# Patient Record
Sex: FEMALE | Race: WHITE | NOT HISPANIC OR LATINO | Employment: PART TIME | URBAN - METROPOLITAN AREA
[De-identification: names, ages, dates, MRNs, and addresses within clinical notes are randomized per-mention and may not be internally consistent; named-entity substitution may affect disease eponyms.]

---

## 2019-06-06 ENCOUNTER — OFFICE VISIT (OUTPATIENT)
Dept: NEUROLOGY | Facility: CLINIC | Age: 19
End: 2019-06-06

## 2019-06-06 VITALS
HEART RATE: 64 BPM | BODY MASS INDEX: 22.16 KG/M2 | WEIGHT: 133 LBS | OXYGEN SATURATION: 99 % | HEIGHT: 65 IN | RESPIRATION RATE: 16 BRPM | DIASTOLIC BLOOD PRESSURE: 62 MMHG | SYSTOLIC BLOOD PRESSURE: 100 MMHG

## 2019-06-06 DIAGNOSIS — F44.9 CONVERSION DISORDER: ICD-10-CM

## 2019-06-06 DIAGNOSIS — G40.909 SEIZURE DISORDER (HCC): Primary | ICD-10-CM

## 2019-06-06 PROCEDURE — 99205 OFFICE O/P NEW HI 60 MIN: CPT | Performed by: PSYCHIATRY & NEUROLOGY

## 2019-06-06 RX ORDER — LEVETIRACETAM 250 MG/1
TABLET ORAL
COMMUNITY
Start: 2019-05-21 | End: 2019-08-15

## 2019-06-06 NOTE — PROGRESS NOTES
Subjective:    CC: Denice Beth is seen today in consultation at the request of Perfecto Marcelino* for Seizures       HPI:  18-year-old female accompanied by her father with a history of anxiety and depression presents with seizure-like episodes.  As per patient she had a first episode about 5 months ago.  This occurred while she was taking a nap and stiffened up followed by whole body shaking that lasted for about 5 minutes.  Denies any bowel or bladder incontinence at the time.  She was taken to the ER in [place] where she had a CT head  and an EEG that was reportedly normal.  After that she was started on Keppra 250 mg twice daily.  Prior to that she had been on Zoloft however her PCP took her off of it as he felt that could be the cause of the seizures.  Since then patient has had 4-5 more episodes.  Most of her episodes are during sleep where her eyes rolled back and she starts to shake lasting for about 10 to 15 minutes.  She can typically hear people around her but cannot respond.  She has also had bladder incontinence but denies any tongue bites.  She is confused for about 30 minutes after the episode and is lethargic for up to 2 days.  She denies any history of febrile seizures as a child, any head injuries, any family history of seizures or any abuse.  Patient has been under a lot of stress recently due to the divorce of her parents, her job and not being able to drive.    The following portions of the patient's history were reviewed today and updated as of 06/06/2019  : allergies, current medications, past family history, past medical history, past social history, past surgical history and problem list  These document will be scanned to patient's chart.      Current Outpatient Medications:   •  levETIRAcetam (KEPPRA) 250 MG tablet, , Disp: , Rfl:    Past Medical History:   Diagnosis Date   • Anxiety    • Depression    • Migraine    • Seizures (CMS/HCC)       History reviewed. No pertinent surgical  history.   Family History   Problem Relation Age of Onset   • Hypertension Father    • Alcohol abuse Maternal Grandmother    • Heart disease Maternal Grandmother    • Hypertension Maternal Grandmother    • Skin cancer Maternal Grandfather    • Diabetes Maternal Grandfather    • Heart disease Maternal Grandfather    • Hypertension Maternal Grandfather       Social History     Socioeconomic History   • Marital status: Single     Spouse name: Not on file   • Number of children: Not on file   • Years of education: Not on file   • Highest education level: Not on file   Tobacco Use   • Smoking status: Never Smoker   • Smokeless tobacco: Never Used   Substance and Sexual Activity   • Alcohol use: No     Frequency: Never   • Drug use: No   • Sexual activity: Defer     Review of Systems   Constitutional: Positive for activity change, appetite change, chills and fatigue.   HENT: Negative.    Eyes: Positive for photophobia, pain and visual disturbance.   Respiratory: Positive for chest tightness, shortness of breath and wheezing.    Cardiovascular: Positive for chest pain and palpitations.   Gastrointestinal: Positive for nausea and vomiting.   Endocrine: Positive for polydipsia.   Genitourinary: Negative.    Musculoskeletal: Positive for back pain, neck pain and neck stiffness.   Skin: Negative.    Allergic/Immunologic: Negative.    Neurological: Positive for dizziness, seizures, speech difficulty, weakness, light-headedness, numbness, headache and confusion.   Hematological: Negative.    Psychiatric/Behavioral: Positive for decreased concentration, sleep disturbance and depressed mood. The patient is nervous/anxious.        Objective:    As noted in the chart    Neurology Exam:    General apperance: NAD.     Mental status: Alert, awake and oriented to time place and person.    Recent and Remote memory: Intact.    Attention span and Concentration: Normal.     Language and Speech: Intact- No dysarthria.    Fluency, Naming ,  Repitition and Comprehension:  Intact    Cranial Nerves:   CN II: Visual fields are full. Intact. Fundi - Normal, No papillederma, Pupils - RYANNE  CN III, IV and VI: Extraocular movements are intact. Normal saccades.   CN V: Facial sensation is intact.   CN VII: Muscles of facial expression reveal no asymmetry. Intact.   CN VIII: Hearing is intact. Whispered voice intact.   CN IX and X: Palate elevates symmetrically. Intact  CN XI: Shoulder shrug is intact.   CN XII: Tongue is midline without evidence of atrophy or fasciculation.     Ophthalmoscopic exam of optic disc-normal    Motor:  Right UE muscle strength 5/5. Normal tone.     Left UE muscle strength 5/5. Normal tone.      Right LE muscle strength5/5. Normal tone.     Left LE muscle strength 5/5. Normal tone.      Sensory: Normal light touch, vibration and pinprick sensation bilaterally.    DTRs: 2+ bilaterally in upper and lower extremities.    Babinski: Negative bilaterally.    Co-ordination: Normal finger-to-nose, heel to shin B/L.    Rhomberg: Negative.    Gait: Normal.    Cardiovascular: Regular rate and rhythm without murmur, gallop or rub.    Assessment and Plan:  1. Seizure disorder (CMS/HCC)  Patient may have a combination epileptic and nonepileptic episodes  I will get an MRI of the brain and a sleep deprived EEG  For now I have asked her to continue Keppra 250 mg twice daily  Counseled on seizure precautions.  Patient is currently not driving    - MRI Brain Without Contrast; Future  - EEG; Future    2. Conversion disorder  Based on the semiology of some of her episodes I definitely feel that she could be having stress-induced nonepileptic episodes  I explained this to her in detail and she seems accepting of the diagnosis  I have asked her to speak to her PCP about restarting Zoloft at lower doses.  Will also give her a referral for psychotherapy    - Ambulatory Referral to Psychotherapy     Addendum-EEG done on 6/27/19 at [facility] was normal  Return in about 2 months (around 8/6/2019).     I spent over 60 minutes with the patient face to face out of which over 50% (30 minutes) was spent in management, instructions and education regarding her epileptic and nonepileptic episodes.     Uzma Law MD

## 2019-06-25 ENCOUNTER — DOCUMENTATION (OUTPATIENT)
Dept: NEUROLOGY | Facility: CLINIC | Age: 19
End: 2019-06-25

## 2019-08-15 ENCOUNTER — OFFICE VISIT (OUTPATIENT)
Dept: NEUROLOGY | Facility: CLINIC | Age: 19
End: 2019-08-15

## 2019-08-15 VITALS
DIASTOLIC BLOOD PRESSURE: 68 MMHG | WEIGHT: 134.8 LBS | SYSTOLIC BLOOD PRESSURE: 110 MMHG | HEIGHT: 65 IN | BODY MASS INDEX: 22.46 KG/M2 | HEART RATE: 72 BPM | OXYGEN SATURATION: 98 %

## 2019-08-15 DIAGNOSIS — F44.9 CONVERSION DISORDER: Primary | ICD-10-CM

## 2019-08-15 PROCEDURE — 99213 OFFICE O/P EST LOW 20 MIN: CPT | Performed by: PSYCHIATRY & NEUROLOGY

## 2019-08-15 RX ORDER — ESCITALOPRAM OXALATE 10 MG/1
TABLET ORAL
COMMUNITY
Start: 2019-08-06

## 2019-08-15 NOTE — PROGRESS NOTES
Subjective:    CC: Denice Beth is seen today for Seizures       HPI:  Current visit- since her last visit her episode frequency has reduced and she had just one event last week where she felt anxious and had breathing difficulties with dizziness followed by shaking for a few minutes.  She had a headache afterwards and was somnolent.  Patient went to [facility] recently but was only started on Lexapro 10 mg daily but did not undergo any psychotherapy yet.  She has now stopped taking Keppra.  Had an EEG in May as well that was normal.  Also had a MRI brain that the results of which were not sent to me.    Initial [id number]-old female accompanied by her father with a history of anxiety and depression presents with seizure-like episodes.  As per patient she had a first episode about 5 months ago.  This occurred while she was taking a nap and stiffened up followed by whole body shaking that lasted for about 5 minutes.  Denies any bowel or bladder incontinence at the time.  She was taken to the ER in [place] where she had a CT head  and an EEG that was reportedly normal.  After that she was started on Keppra 250 mg twice daily.  Prior to that she had been on Zoloft however her PCP took her off of it as he felt that could be the cause of the seizures.  Since then patient has had 4-5 more episodes.  Most of her episodes are during sleep where her eyes rolled back and she starts to shake lasting for about 10 to 15 minutes.  She can typically hear people around her but cannot respond.  She has also had bladder incontinence but denies any tongue bites.  She is confused for about 30 minutes after the episode and is lethargic for up to 2 days.  She denies any history of febrile seizures as a child, any head injuries, any family history of seizures or any abuse.  Patient has been under a lot of stress recently due to the divorce of her parents, her job and not being able to drive.    The following portions  "of the patient's history were reviewed today and updated as of 08/15/2019  : allergies, current medications, past family history, past medical history, past social history, past surgical history and problem list  These document will be scanned to patient's chart.      Current Outpatient Medications:   •  escitalopram (LEXAPRO) 10 MG tablet, , Disp: , Rfl:    Past Medical History:   Diagnosis Date   • Anxiety    • Depression    • Migraine    • Seizures (CMS/HCC)       No past surgical history on file.   Family History   Problem Relation Age of Onset   • Hypertension Father    • Alcohol abuse Maternal Grandmother    • Heart disease Maternal Grandmother    • Hypertension Maternal Grandmother    • Skin cancer Maternal Grandfather    • Diabetes Maternal Grandfather    • Heart disease Maternal Grandfather    • Hypertension Maternal Grandfather       Social History     Socioeconomic History   • Marital status: Single     Spouse name: Not on file   • Number of children: Not on file   • Years of education: Not on file   • Highest education level: Not on file   Tobacco Use   • Smoking status: Never Smoker   • Smokeless tobacco: Never Used   Substance and Sexual Activity   • Alcohol use: No     Frequency: Never   • Drug use: No   • Sexual activity: Defer     Review of Systems   Neurological: Positive for seizures, headache, memory problem and confusion.   All other systems reviewed and are negative.      Objective:    /68   Pulse 72   Ht 165.1 cm (65\")   Wt 61.1 kg (134 lb 12.8 oz)   SpO2 98%   BMI 22.43 kg/m²     Neurology Exam:    General apperance: NAD.     Mental status: Alert, awake and oriented to time place and person.    Recent and Remote memory: Intact.    Attention span and Concentration: Normal.     Language and Speech: Intact- No dysarthria.    Fluency, Naming , Repitition and Comprehension:  Intact    Cranial Nerves:   CN II: Visual fields are full. Intact. Fundi - Normal, No papillederma, Pupils - " RYANNE  CN III, IV and VI: Extraocular movements are intact. Normal saccades.   CN V: Facial sensation is intact.   CN VII: Muscles of facial expression reveal no asymmetry. Intact.   CN VIII: Hearing is intact. Whispered voice intact.   CN IX and X: Palate elevates symmetrically. Intact  CN XI: Shoulder shrug is intact.   CN XII: Tongue is midline without evidence of atrophy or fasciculation.     Ophthalmoscopic exam of optic disc-normal    Motor:  Right UE muscle strength 5/5. Normal tone.     Left UE muscle strength 5/5. Normal tone.      Right LE muscle strength5/5. Normal tone.     Left LE muscle strength 5/5. Normal tone.      Sensory: Normal light touch, vibration and pinprick sensation bilaterally.    DTRs: 2+ bilaterally in upper and lower extremities.    Babinski: Negative bilaterally.    Co-ordination: Normal finger-to-nose, heel to shin B/L.    Rhomberg: Negative.    Gait: Normal.    Cardiovascular: Regular rate and rhythm without murmur, gallop or rub.    Assessment and Plan:  1. Conversion disorder  At her last visit and again at this visit I spoke to the patient and her father in detail about psychogenic nonepileptic spells and on ways to cope with them.  She has started Lexapro 10 mg daily but I also feel that she should undergo counseling for these episodes.  Will obtain the results of her MRI brain.    Return in about 6 months (around 2/15/2020).     Uzma Law MD

## 2019-12-16 ENCOUNTER — OFFICE VISIT (OUTPATIENT)
Dept: NEUROLOGY | Facility: CLINIC | Age: 19
End: 2019-12-16

## 2019-12-16 VITALS
DIASTOLIC BLOOD PRESSURE: 72 MMHG | HEART RATE: 82 BPM | WEIGHT: 130 LBS | SYSTOLIC BLOOD PRESSURE: 116 MMHG | HEIGHT: 65 IN | BODY MASS INDEX: 21.66 KG/M2 | OXYGEN SATURATION: 98 %

## 2019-12-16 DIAGNOSIS — F44.9 CONVERSION DISORDER: Primary | ICD-10-CM

## 2019-12-16 PROBLEM — F41.9 ANXIETY: Status: ACTIVE | Noted: 2019-12-16

## 2019-12-16 PROCEDURE — 99213 OFFICE O/P EST LOW 20 MIN: CPT | Performed by: PSYCHIATRY & NEUROLOGY

## 2019-12-16 RX ORDER — BUSPIRONE HYDROCHLORIDE 7.5 MG/1
7.5 TABLET ORAL 2 TIMES DAILY
Qty: 60 TABLET | Refills: 3 | Status: SHIPPED | OUTPATIENT
Start: 2019-12-16

## 2019-12-16 NOTE — PROGRESS NOTES
Subjective:    CC: Denice Beth is seen today   for Seizure like episodes        HPI:  Current visit- since her last visit her episodes have worsened and she is currently having about 1 episode each week.  She has a dull headache prior to the episode and then stiffens up and starts to shake.  The shaking typically lasts for over 5 minutes.  Patient has no recollection of the episode.  Patient states that she does try to pull herself out of an episode when it begins but is unable to do so recently.  She went to [facility] however they did not start psychotherapy, instead put her on Lexapro which does not seem to be helping with her anxiety.  Has recently started seeing a therapist in [place] but has not made any progress in terms of the spells yet.    Last visit-since her last visit her episode frequency has reduced and she had just one event last week where she felt anxious and had breathing difficulties with dizziness followed by shaking for a few minutes.  She had a headache afterwards and was somnolent.  Patient went to [facility] recently but was only started on Lexapro 10 mg daily but did not undergo any psychotherapy yet.  She has now stopped taking Keppra.  Had an EEG in May as well that was normal.  Also had a MRI brain that the results of which were not sent to me.    Initial [id number]-old female accompanied by her father with a history of anxiety and depression presents with seizure-like episodes.  As per patient she had a first episode about 5 months ago.  This occurred while she was taking a nap and stiffened up followed by whole body shaking that lasted for about 5 minutes.  Denies any bowel or bladder incontinence at the time.  She was taken to the ER in [place] where she had a CT head  and an EEG that was reportedly normal.  After that she was started on Keppra 250 mg twice daily.  Prior to that she had been on Zoloft however her PCP took her off of it as he  felt that could be the cause of the seizures.  Since then patient has had 4-5 more episodes.  Most of her episodes are during sleep where her eyes rolled back and she starts to shake lasting for about 10 to 15 minutes.  She can typically hear people around her but cannot respond.  She has also had bladder incontinence but denies any tongue bites.  She is confused for about 30 minutes after the episode and is lethargic for up to 2 days.  She denies any history of febrile seizures as a child, any head injuries, any family history of seizures or any abuse.  Patient has been under a lot of stress recently due to the divorce of her parents, her job and not being able to drive    The following portions of the patient's history were reviewed today and updated as of 12/16/2019  : allergies, current medications, past family history, past medical history, past social history, past surgical history and problem list  These document will be scanned to patient's chart.      Current Outpatient Medications:   •  escitalopram (LEXAPRO) 10 MG tablet, , Disp: , Rfl:   •  busPIRone (BUSPAR) 7.5 MG tablet, Take 1 tablet by mouth 2 (Two) Times a Day., Disp: 60 tablet, Rfl: 3   Past Medical History:   Diagnosis Date   • Anxiety    • Depression    • Migraine    • Seizures (CMS/HCC)       No past surgical history on file.   Family History   Problem Relation Age of Onset   • Hypertension Father    • Alcohol abuse Maternal Grandmother    • Heart disease Maternal Grandmother    • Hypertension Maternal Grandmother    • Skin cancer Maternal Grandfather    • Diabetes Maternal Grandfather    • Heart disease Maternal Grandfather    • Hypertension Maternal Grandfather       Social History     Socioeconomic History   • Marital status: Single     Spouse name: Not on file   • Number of children: Not on file   • Years of education: Not on file   • Highest education level: Not on file   Tobacco Use   • Smoking status: Never Smoker   • Smokeless tobacco:  "Never Used   Substance and Sexual Activity   • Alcohol use: No     Frequency: Never   • Drug use: No   • Sexual activity: Defer     Review of Systems   Neurological: Positive for seizures and headache.   All other systems reviewed and are negative.      Objective:    /72   Pulse 82   Ht 165.1 cm (65\")   Wt 59 kg (130 lb)   SpO2 98%   BMI 21.63 kg/m²     Neurology Exam:    General apperance: NAD.     Mental status: Alert, awake and oriented to time place and person.    Recent and Remote memory: Intact.    Attention span and Concentration: Normal.     Language and Speech: Intact- No dysarthria.    Fluency, Naming , Repitition and Comprehension:  Intact    Cranial Nerves:   CN II: Visual fields are full. Intact. Fundi - Normal, No papillederma, Pupils - RYANNE  CN III, IV and VI: Extraocular movements are intact. Normal saccades.   CN V: Facial sensation is intact.   CN VII: Muscles of facial expression reveal no asymmetry. Intact.   CN VIII: Hearing is intact. Whispered voice intact.   CN IX and X: Palate elevates symmetrically. Intact  CN XI: Shoulder shrug is intact.   CN XII: Tongue is midline without evidence of atrophy or fasciculation.     Ophthalmoscopic exam of optic disc-normal    Motor:  Right UE muscle strength 5/5. Normal tone.     Left UE muscle strength 5/5. Normal tone.      Right LE muscle strength5/5. Normal tone.     Left LE muscle strength 5/5. Normal tone.      Sensory: Normal light touch, vibration and pinprick sensation bilaterally.    DTRs: 2+ bilaterally in upper and lower extremities.    Babinski: Negative bilaterally.    Co-ordination: Normal finger-to-nose, heel to shin B/L.    Rhomberg: Negative.    Gait: Normal.    Cardiovascular: Regular rate and rhythm without murmur, gallop or rub.    Assessment and Plan:  1. Conversion disorder  Patient most likely has psychogenic nonepileptic episodes based on the semiology of these episodes.  She had an EEG that was normal.  Her MRI brain " results were never sent to me  I will start her on low doses of BuSpar 7.5 mg twice a day for anxiety. Is also on lexapro  I will again make a referral for psychotherapy which I feel is the mainstay of treatment for these episodes    - Ambulatory Referral to Psychotherapy       Return in about 3 months (around 3/16/2020).         Uzma Law MD

## 2020-03-16 ENCOUNTER — OFFICE VISIT (OUTPATIENT)
Dept: NEUROLOGY | Facility: CLINIC | Age: 20
End: 2020-03-16

## 2020-03-16 VITALS — WEIGHT: 130 LBS | HEIGHT: 65 IN | OXYGEN SATURATION: 99 % | BODY MASS INDEX: 21.66 KG/M2 | HEART RATE: 100 BPM

## 2020-03-16 DIAGNOSIS — F44.9 CONVERSION DISORDER: Primary | ICD-10-CM

## 2020-03-16 PROCEDURE — 99213 OFFICE O/P EST LOW 20 MIN: CPT | Performed by: PSYCHIATRY & NEUROLOGY

## 2020-03-16 NOTE — PROGRESS NOTES
Subjective:    CC: Denice Beth is seen today  for Seizures       HPI:  Current visit- patient states that she has had 3 seizure-like episodes since she last saw me in August.  Her last such episode was on February 23 when she started shaking for about 3 minutes.  This was witnessed by her father.  Once again patient could hear her father speak to her but could not respond back.  She did not bite her tongue or lose bowel/bladder incontinence.  Sometimes her father counting numbers to calm her down can help abort an event.  Patient states that she takes an extra dose of Lexapro 10 mg daily if she is very anxious.  Has stopped going to [facility] as she was not happy with her care.    Last visit-since her last visit her episodes have worsened and she is currently having about 1 episode each week.  She has a dull headache prior to the episode and then stiffens up and starts to shake.  The shaking typically lasts for over 5 minutes.  Patient has no recollection of the episode.  Patient states that she does try to pull herself out of an episode when it begins but is unable to do so recently.  She went to [facility] however they did not start psychotherapy, instead put her on Lexapro which does not seem to be helping with her anxiety.  Has recently started seeing a therapist in [place] but has not made any progress in terms of the spells yet.    Last visit-since her last visit her episode frequency has reduced and she had just one event last week where she felt anxious and had breathing difficulties with dizziness followed by shaking for a few minutes.  She had a headache afterwards and was somnolent.  Patient went to [facility] recently but was only started on Lexapro 10 mg daily but did not undergo any psychotherapy yet.  She has now stopped taking Keppra.  Had an EEG in May as well that was normal.  Also had a MRI brain that the results of which were not sent to  me.    Initial [id number]old female accompanied by her father with a history of anxiety and depression presents with seizure-like episodes.  As per patient she had a first episode about 5 months ago.  This occurred while she was taking a nap and stiffened up followed by whole body shaking that lasted for about 5 minutes.  Denies any bowel or bladder incontinence at the time.  She was taken to the ER in [place] where she had a CT head  and an EEG that was reportedly normal.  After that she was started on Keppra 250 mg twice daily.  Prior to that she had been on Zoloft however her PCP took her off of it as he felt that could be the cause of the seizures.  Since then patient has had 4-5 more episodes.  Most of her episodes are during sleep where her eyes rolled back and she starts to shake lasting for about 10 to 15 minutes.  She can typically hear people around her but cannot respond.  She has also had bladder incontinence but denies any tongue bites.  She is confused for about 30 minutes after the episode and is lethargic for up to 2 days.  She denies any history of febrile seizures as a child, any head injuries, any family history of seizures or any abuse.  Patient has been under a lot of stress recently due to the divorce of her parents, her job and not being able to drive    The following portions of the patient's history were reviewed today and updated as of 03/16/2020  : allergies, current medications, past family history, past medical history, past social history, past surgical history and problem list  These document will be scanned to patient's chart.      Current Outpatient Medications:   •  busPIRone (BUSPAR) 7.5 MG tablet, Take 1 tablet by mouth 2 (Two) Times a Day., Disp: 60 tablet, Rfl: 3  •  escitalopram (LEXAPRO) 10 MG tablet, , Disp: , Rfl:    Past Medical History:   Diagnosis Date   • Anxiety    • Depression    • Migraine    • Seizures (CMS/HCC)       No past surgical history on file.   Family  "History   Problem Relation Age of Onset   • Hypertension Father    • Alcohol abuse Maternal Grandmother    • Heart disease Maternal Grandmother    • Hypertension Maternal Grandmother    • Skin cancer Maternal Grandfather    • Diabetes Maternal Grandfather    • Heart disease Maternal Grandfather    • Hypertension Maternal Grandfather       Social History     Socioeconomic History   • Marital status: Single     Spouse name: Not on file   • Number of children: Not on file   • Years of education: Not on file   • Highest education level: Not on file   Tobacco Use   • Smoking status: Never Smoker   • Smokeless tobacco: Never Used   Substance and Sexual Activity   • Alcohol use: No     Frequency: Never   • Drug use: No   • Sexual activity: Defer     Review of Systems   All other systems reviewed and are negative.      Objective:    Pulse 100   Ht 165.1 cm (65\")   Wt 59 kg (130 lb)   SpO2 99%   BMI 21.63 kg/m²     Neurology Exam:    General apperance: NAD.     Mental status: Alert, awake and oriented to time place and person.    Recent and Remote memory: Intact.    Attention span and Concentration: Normal.     Language and Speech: Intact- No dysarthria.    Fluency, Naming , Repitition and Comprehension:  Intact    Cranial Nerves:   CN II: Visual fields are full. Intact. Fundi - Normal, No papillederma, Pupils - RYANNE  CN III, IV and VI: Extraocular movements are intact. Normal saccades.   CN V: Facial sensation is intact.   CN VII: Muscles of facial expression reveal no asymmetry. Intact.   CN VIII: Hearing is intact. Whispered voice intact.   CN IX and X: Palate elevates symmetrically. Intact  CN XI: Shoulder shrug is intact.   CN XII: Tongue is midline without evidence of atrophy or fasciculation.     Ophthalmoscopic exam of optic disc-normal    Motor:  Right UE muscle strength 5/5. Normal tone.     Left UE muscle strength 5/5. Normal tone.      Right LE muscle strength5/5. Normal tone.     Left LE muscle strength " 5/5. Normal tone.      Sensory: Normal light touch, vibration and pinprick sensation bilaterally.    DTRs: 2+ bilaterally in upper and lower extremities.    Babinski: Negative bilaterally.    Co-ordination: Normal finger-to-nose, heel to shin B/L.    Rhomberg: Negative.    Gait: Normal.    Cardiovascular: Regular rate and rhythm without murmur, gallop or rub.    Assessment and Plan:  1. Conversion disorder  Patient most likely has stress-induced nonepileptic episodes.  I have explained these to her and her father in detail at her previous visit  I will give her a new referral for psychotherapy with Ms. Sherrie Stephens for these episodes  She can continue Lexapro and buspirone  Once again I counseled her to cope up with the spells by finding ways to calm herself down    - Ambulatory Referral to Psychotherapy       Return in about 6 months (around 9/16/2020).         Uzma Law MD

## 2022-05-05 ENCOUNTER — TRANSCRIBE ORDERS (OUTPATIENT)
Dept: LAB | Facility: HOSPITAL | Age: 22
End: 2022-05-05

## 2022-05-05 ENCOUNTER — LAB (OUTPATIENT)
Dept: LAB | Facility: HOSPITAL | Age: 22
End: 2022-05-05

## 2022-05-05 DIAGNOSIS — N96 HABITUAL ABORTER: ICD-10-CM

## 2022-05-05 DIAGNOSIS — Z31.438 ENCOUNTER FOR OTHER GENETIC TESTING OF FEMALE FOR PROCREATIVE MANAGEMENT: ICD-10-CM

## 2022-05-05 DIAGNOSIS — N96 HABITUAL ABORTER: Primary | ICD-10-CM

## 2022-05-05 LAB
ABO GROUP BLD: NORMAL
BLD GP AB SCN SERPL QL: NEGATIVE
PROLACTIN SERPL-MCNC: 13.8 NG/ML (ref 4.79–23.3)
RH BLD: POSITIVE
TESTOST SERPL-MCNC: 50 NG/DL (ref 8.4–48.1)
TSH SERPL DL<=0.05 MIU/L-ACNC: 0.91 UIU/ML (ref 0.27–4.2)

## 2022-05-05 PROCEDURE — 85732 THROMBOPLASTIN TIME PARTIAL: CPT

## 2022-05-05 PROCEDURE — 86901 BLOOD TYPING SEROLOGIC RH(D): CPT

## 2022-05-05 PROCEDURE — 85670 THROMBIN TIME PLASMA: CPT

## 2022-05-05 PROCEDURE — 85705 THROMBOPLASTIN INHIBITION: CPT

## 2022-05-05 PROCEDURE — 84146 ASSAY OF PROLACTIN: CPT

## 2022-05-05 PROCEDURE — 85613 RUSSELL VIPER VENOM DILUTED: CPT

## 2022-05-05 PROCEDURE — 36415 COLL VENOUS BLD VENIPUNCTURE: CPT

## 2022-05-05 PROCEDURE — 86146 BETA-2 GLYCOPROTEIN ANTIBODY: CPT

## 2022-05-05 PROCEDURE — 84443 ASSAY THYROID STIM HORMONE: CPT

## 2022-05-05 PROCEDURE — 84403 ASSAY OF TOTAL TESTOSTERONE: CPT

## 2022-05-05 PROCEDURE — 86850 RBC ANTIBODY SCREEN: CPT

## 2022-05-05 PROCEDURE — 86147 CARDIOLIPIN ANTIBODY EA IG: CPT

## 2022-05-05 PROCEDURE — 83498 ASY HYDROXYPROGESTERONE 17-D: CPT

## 2022-05-05 PROCEDURE — 82627 DEHYDROEPIANDROSTERONE: CPT

## 2022-05-05 PROCEDURE — 86900 BLOOD TYPING SEROLOGIC ABO: CPT

## 2022-05-07 LAB
APTT SCREEN TO CONFIRM RATIO: 1.05 RATIO (ref 0–1.34)
CARDIOLIPIN IGA SER IA-ACNC: <9 APL U/ML (ref 0–11)
CARDIOLIPIN IGG SER IA-ACNC: <9 GPL U/ML (ref 0–14)
CARDIOLIPIN IGM SER IA-ACNC: 11 MPL U/ML (ref 0–12)
CONFIRM APTT/NORMAL: 35.4 SEC (ref 0–47.6)
DHEA-S SERPL-MCNC: 350 UG/DL (ref 110–431.7)
LA 2 SCREEN W REFLEX-IMP: NORMAL
SCREEN APTT: 34.1 SEC (ref 0–51.9)
SCREEN DRVVT: 42.4 SEC (ref 0–47)
THROMBIN TIME: 17.6 SEC (ref 0–23)

## 2022-05-09 LAB
B2 GLYCOPROT1 IGG SER-ACNC: <9 GPI IGG UNITS (ref 0–20)
B2 GLYCOPROT1 IGM SER-ACNC: <9 GPI IGM UNITS (ref 0–32)

## 2022-05-13 LAB — 17OHP SERPL-MCNC: 43 NG/DL

## 2022-05-17 LAB — CFTR MUT ANL BLD/T: NORMAL
